# Patient Record
Sex: MALE | Race: WHITE | NOT HISPANIC OR LATINO | Employment: FULL TIME | URBAN - METROPOLITAN AREA
[De-identification: names, ages, dates, MRNs, and addresses within clinical notes are randomized per-mention and may not be internally consistent; named-entity substitution may affect disease eponyms.]

---

## 2018-09-14 ENCOUNTER — SURGERY - HEALTHEAST (OUTPATIENT)
Dept: SURGERY | Facility: HOSPITAL | Age: 28
End: 2018-09-14

## 2018-09-14 ENCOUNTER — ANESTHESIA - HEALTHEAST (OUTPATIENT)
Dept: SURGERY | Facility: HOSPITAL | Age: 28
End: 2018-09-14

## 2018-09-14 ASSESSMENT — MIFFLIN-ST. JEOR: SCORE: 1513.31

## 2018-09-21 ENCOUNTER — OFFICE VISIT - HEALTHEAST (OUTPATIENT)
Dept: SURGERY | Facility: CLINIC | Age: 28
End: 2018-09-21

## 2018-09-21 DIAGNOSIS — Z48.89 POSTOPERATIVE VISIT: ICD-10-CM

## 2018-09-21 ASSESSMENT — MIFFLIN-ST. JEOR: SCORE: 1513.31

## 2018-09-28 ENCOUNTER — OFFICE VISIT - HEALTHEAST (OUTPATIENT)
Dept: SURGERY | Facility: CLINIC | Age: 28
End: 2018-09-28

## 2018-09-28 DIAGNOSIS — Z48.89 POSTOPERATIVE VISIT: ICD-10-CM

## 2018-09-28 ASSESSMENT — MIFFLIN-ST. JEOR: SCORE: 1513.31

## 2021-06-02 VITALS — WEIGHT: 130 LBS | BODY MASS INDEX: 20.4 KG/M2 | HEIGHT: 67 IN

## 2021-06-02 VITALS — BODY MASS INDEX: 20.4 KG/M2 | HEIGHT: 67 IN | WEIGHT: 130 LBS

## 2021-06-16 PROBLEM — L02.211 ABDOMINAL WALL ABSCESS: Status: ACTIVE | Noted: 2018-09-14

## 2021-06-16 PROBLEM — L02.91 ABSCESS: Status: ACTIVE | Noted: 2018-09-14

## 2021-06-20 NOTE — PROGRESS NOTES
" HPI: Yaakov Cerda is here for follow up of a left abdominal wound with a Penrose.  He continues to have purulent drainage daily but states that it is improving.  He denies any fevers, chills nausea or vomiting.    Allergies, Medications, Social History, Past Medical History and Past Surgical History were reviewed and are noted in the chart.    /63 (Patient Site: Right Arm, Patient Position: Sitting, Cuff Size: Adult Regular)  Pulse 80  Ht 5' 7\" (1.702 m)  Wt 130 lb (59 kg)  SpO2 100%  BMI 20.36 kg/m2  Body mass index is 20.36 kg/(m^2).      EXAM:   GENERAL: Appears well  Abdomen- left-sided abdomen with Penrose drain in place no recurrent fluctuant masses no stigmata of infection    Drain Left;Anterior Abdomen (Active)   Site Description Unable to view 9/15/2018  4:00 PM   Dressing Status  Intact;Dry;Clean 9/15/2018  4:00 PM       Incision 09/14/18  Abdomen Left (Active)   Site Assessment Covered, not assessed 9/16/2018  4:13 PM   Surrounding Tissue Assessment Intact 9/14/2018  4:45 PM   Drainage Amount Large 9/14/2018  4:45 PM   Drainage Description Purulent (Pus-Like) 9/14/2018  4:45 PM   Dressing Dry gauze 9/15/2018  3:58 PM   Dressing Status  Intact 9/15/2018  3:58 PM       Incision 09/14/18 Abdomen (Active)   Site Assessment Covered, not assessed 9/16/2018  4:13 PM   Surrounding Tissue Assessment Covered, not assessed 9/16/2018  4:13 PM   Closure Covered, not assessed 9/16/2018 12:00 AM   Drainage Amount Moderate 9/15/2018  8:44 PM   Drainage Description Serosanguineous;Purulent (Pus-Like) 9/15/2018  8:44 PM   Dressing Dry gauze 9/16/2018  7:59 AM   Dressing Status  Intact;Dry;Clean 9/16/2018  4:13 PM       Assessment/Plan: Yaakov Cerda continues to do well. He continues to have purulent drainage from his MRSA wound.  At this point I will continue with the Penrose drain in place for now.  I will have him follow-up in 1 week for possible drain removal and ongoing follow-up.    Yaakov VICTOR" Elsie BLOCK FACS  [facility] Department of Surgery

## 2021-06-20 NOTE — ANESTHESIA POSTPROCEDURE EVALUATION
Patient: Yaakov Cerda  INCISION AND DRAINAGE, TORSO  Anesthesia type: MAC    Patient location: PACU  Last vitals:   Vitals:    09/14/18 2040   BP: 107/57   Pulse: 62   Resp: 17   Temp:    SpO2: 98%     Post vital signs: stable  Level of consciousness: awake and responds to simple questions  Post-anesthesia pain: pain controlled  Post-anesthesia nausea and vomiting: no  Pulmonary: unassisted, return to baseline  Cardiovascular: stable and blood pressure at baseline  Hydration: adequate  Anesthetic events: no    QCDR Measures:  ASA# 11 - Nicolle-op Cardiac Arrest: ASA11B - Patient did NOT experience unanticipated cardiac arrest  ASA# 12 - Nicolle-op Mortality Rate: ASA12B - Patient did NOT die  ASA# 13 - PACU Re-Intubation Rate: NA - No ETT / LMA used for case  ASA# 10 - Composite Anes Safety: ASA10A - No serious adverse event    Additional Notes:

## 2021-06-20 NOTE — ANESTHESIA PREPROCEDURE EVALUATION
Anesthesia Evaluation      Patient summary reviewed   No history of anesthetic complications     Airway   Mallampati: II  Neck ROM: full   Pulmonary - normal exam    breath sounds clear to auscultation  (+) a smoker                         Cardiovascular - negative ROS  Rhythm: regular  Rate: normal,         Neuro/Psych - negative ROS     Endo/Other - negative ROS      GI/Hepatic/Renal      Comments: Crohn's disease  NPO since 9 am  Denies N/V          Dental    (+) poor dentition                       Anesthesia Plan  Planned anesthetic: MAC  Versed, fentanyl, propofol  Discussed possibility of GA/LMA/ETT  ASA 2     Anesthetic plan and risks discussed with: patient  Anesthesia plan special considerations: antiemetics,   Post-op plan: routine recovery

## 2021-06-20 NOTE — PROGRESS NOTES
" HPI: Yaakov Cerda is here for follow up of abdominal wound.  His drain is not putting out any further purulent material.  He is doing well with no complaints.    Allergies, Medications, Social History, Past Medical History and Past Surgical History were reviewed and are noted in the chart.    /65 (Patient Site: Right Arm, Patient Position: Sitting, Cuff Size: Adult Regular)  Pulse 80  Ht 5' 7\" (1.702 m)  Wt 130 lb (59 kg)  SpO2 99%  BMI 20.36 kg/m2  Body mass index is 20.36 kg/(m^2).      EXAM:   GENERAL: Appears well  Abdomen-Penrose drain in place with no stigmata of infection no further drainage    Drain Left;Anterior Abdomen (Active)   Site Description Unable to view 9/15/2018  4:00 PM   Dressing Status  Intact;Dry;Clean 9/15/2018  4:00 PM       Incision 09/14/18  Abdomen Left (Active)   Site Assessment Covered, not assessed 9/16/2018  4:13 PM   Surrounding Tissue Assessment Intact 9/14/2018  4:45 PM   Drainage Amount Large 9/14/2018  4:45 PM   Drainage Description Purulent (Pus-Like) 9/14/2018  4:45 PM   Dressing Dry gauze 9/15/2018  3:58 PM   Dressing Status  Intact 9/15/2018  3:58 PM       Incision 09/14/18 Abdomen (Active)   Site Assessment Covered, not assessed 9/16/2018  4:13 PM   Surrounding Tissue Assessment Covered, not assessed 9/16/2018  4:13 PM   Closure Covered, not assessed 9/16/2018 12:00 AM   Drainage Amount Moderate 9/15/2018  8:44 PM   Drainage Description Serosanguineous;Purulent (Pus-Like) 9/15/2018  8:44 PM   Dressing Dry gauze 9/16/2018  7:59 AM   Dressing Status  Intact;Dry;Clean 9/16/2018  4:13 PM       Assessment/Plan: Yaakov Cerda continues to do well. He wound is healing and overall progressing well.  I have removed the drain in clinic and he is doing well.  I suspect this will heal up with no further complications.  He may now follow up as needed.    Yaakov Zimmerman  DO [facility] Department of Surgery  "

## 2021-06-20 NOTE — ANESTHESIA CARE TRANSFER NOTE
Last vitals:   Vitals:    09/14/18 2040   BP: 107/57   Pulse: 62   Resp: 17   Temp:    SpO2: 98%     Patient's level of consciousness is drowsy  Spontaneous respirations: yes  Maintains airway independently: yes  Dentition unchanged: yes  Oropharynx: oropharynx clear of all foreign objects    QCDR Measures:  ASA# 20 - Surgical Safety Checklist: WHO surgical safety checklist completed prior to induction  PQRS# 430 - Adult PONV Prevention: 4558F - Pt received => 2 anti-emetic agents (different classes) preop & intraop  ASA# 8 - Peds PONV Prevention: NA - Not pediatric patient, not GA or 2 or more risk factors NOT present  PQRS# 424 - Nicolle-op Temp Management: NA - MAC anesthesia or case < 60 minutes  PQRS# 426 - PACU Transfer Protocol: - Transfer of care checklist used  ASA# 14 - Acute Post-op Pain: ASA14B - Patient did NOT experience pain >= 7 out of 10

## 2024-11-10 ENCOUNTER — HOSPITAL ENCOUNTER (EMERGENCY)
Facility: CLINIC | Age: 34
Discharge: HOME | End: 2024-11-10
Attending: EMERGENCY MEDICINE | Admitting: EMERGENCY MEDICINE
Payer: COMMERCIAL

## 2024-11-10 VITALS
WEIGHT: 137 LBS | OXYGEN SATURATION: 100 % | HEART RATE: 73 BPM | RESPIRATION RATE: 16 BRPM | BODY MASS INDEX: 21.46 KG/M2 | SYSTOLIC BLOOD PRESSURE: 143 MMHG | TEMPERATURE: 97 F | DIASTOLIC BLOOD PRESSURE: 91 MMHG

## 2024-11-10 DIAGNOSIS — F41.9 ANXIETY: ICD-10-CM

## 2024-11-10 PROCEDURE — 99283 EMERGENCY DEPT VISIT LOW MDM: CPT | Performed by: EMERGENCY MEDICINE

## 2024-11-10 RX ORDER — CYCLOBENZAPRINE HCL 10 MG
10 TABLET ORAL
COMMUNITY
Start: 2024-09-15

## 2024-11-10 RX ORDER — KETOROLAC TROMETHAMINE 10 MG/1
10 TABLET, FILM COATED ORAL
COMMUNITY
Start: 2024-11-04 | End: 2024-11-11

## 2024-11-10 RX ORDER — OLANZAPINE 2.5 MG/1
2.5 TABLET, FILM COATED ORAL
COMMUNITY
Start: 2024-11-08

## 2024-11-10 RX ORDER — INFLIXIMAB 100 MG/10ML
INJECTION, POWDER, LYOPHILIZED, FOR SOLUTION INTRAVENOUS
COMMUNITY

## 2024-11-10 ASSESSMENT — COLUMBIA-SUICIDE SEVERITY RATING SCALE - C-SSRS
2. HAVE YOU ACTUALLY HAD ANY THOUGHTS OF KILLING YOURSELF IN THE PAST MONTH?: NO
1. IN THE PAST MONTH, HAVE YOU WISHED YOU WERE DEAD OR WISHED YOU COULD GO TO SLEEP AND NOT WAKE UP?: YES
6. HAVE YOU EVER DONE ANYTHING, STARTED TO DO ANYTHING, OR PREPARED TO DO ANYTHING TO END YOUR LIFE?: YES

## 2024-11-10 ASSESSMENT — ACTIVITIES OF DAILY LIVING (ADL)
ADLS_ACUITY_SCORE: 0

## 2024-11-10 NOTE — DISCHARGE INSTRUCTIONS
Return if symptoms worsen or new symptoms develop.  Follow-up with primary care physician next available.  Drink plenty fluids.  Therapist tomorrow.  Follow-up on your care plan as directed.  Work towards getting rides to work.  You contracted for safety with tach and if any thoughts of harming self please return for recheck.

## 2024-11-10 NOTE — ED TRIAGE NOTES
Here for ongoing anxiety issues, this is his 3rd different hospital visit this week, was started on zyprexa on the 8th, has taken one dose so far, has minor other muscle aches and pains but was given medications to take for those on the 8th     Triage Assessment (Adult)       Row Name 11/10/24 0939          Triage Assessment    Airway WDL WDL        Respiratory WDL    Respiratory WDL WDL        Peripheral/Neurovascular WDL    Peripheral Neurovascular WDL WDL        Cognitive/Neuro/Behavioral WDL    Cognitive/Neuro/Behavioral WDL WDL

## 2024-11-10 NOTE — Clinical Note
Yaakov Cerda was seen and treated in our emergency department on 11/10/2024.  He may return to work on 11/13/2024.       If you have any questions or concerns, please don't hesitate to call.      Evaristo Castillo MD

## 2024-11-10 NOTE — ED PROVIDER NOTES
History     Chief Complaint   Patient presents with    Anxiety     HPI  Yaakov Cerda is a 34 year old male with past medical history significant for  Crohn's disease small bowel obstruction EtOH abuse PTSD and anxiety who presents emergency department complaining of increasing anxiety and thoughts of harm.  Patient states he has been having increasing issues lately and his mind in his body are not allowing him to move forward.  He feels very anxious about what is going on in the future he has been seen for a psychiatric evaluation on 11/5/2024 in [place] they did not feel he met criteria also had numerous x-rays and labs done at that time he was seen by behavioral health on the eighth and started on Zyprexa.  He is taken 1 dose of diet his Zyprexa states he is feeling worsening anxiety.  Allergies:  No Known Allergies    Problem List:    Patient Active Problem List    Diagnosis Date Noted    Abdominal wall abscess 09/14/2018     Priority: Medium    Abscess 09/14/2018     Priority: Medium     Added automatically from request for surgery [id number]            Past Medical History:    No past medical history on file.    Past Surgical History:    Past Surgical History:   Procedure Laterality Date    COLON SURGERY      INCISION AND DRAINAGE OF WOUND Left 9/14/2018    Procedure: INCISION AND DRAINAGE, TORSO;  Surgeon: Yaakov Zimmerman DO;  Location: [facility];  Service:        Family History:    No family history on file.    Social History:  Marital Status:  Single [1]  Social History     Tobacco Use    Smoking status: Every Day     Current packs/day: 0.70     Average packs/day: 0.7 packs/day for 5.0 years (3.5 ttl pk-yrs)     Types: Cigarettes    Smokeless tobacco: Never   Substance Use Topics    Alcohol use: No    Drug use: No        Medications:    cyclobenzaprine (FLEXERIL) 10 MG tablet  diclofenac (VOLTAREN) 50 MG EC tablet  ketorolac (TORADOL) 10 MG tablet  OLANZapine (ZYPREXA) 2.5 MG  tablet  ibuprofen (ADVIL,MOTRIN) 200 MG tablet  inFLIXimab, REMICADE, 100 MG injection          Review of Systems  As per HPI  Physical Exam   BP: (!) 143/91  Pulse: 73  Temp: 97  F (36.1  C)  Resp: 16  Weight: 62.1 kg (137 lb)  SpO2: 100 %      Physical Exam  Vitals and nursing note reviewed.   Constitutional:       General: He is not in acute distress.     Appearance: Normal appearance. He is not ill-appearing, toxic-appearing or diaphoretic.   HENT:      Head: Normocephalic and atraumatic.   Eyes:      Conjunctiva/sclera: Conjunctivae normal.   Cardiovascular:      Rate and Rhythm: Normal rate and regular rhythm.      Pulses: Normal pulses.      Heart sounds: Normal heart sounds. No murmur heard.  Pulmonary:      Effort: Pulmonary effort is normal.      Breath sounds: Normal breath sounds. No stridor. No wheezing or rhonchi.   Musculoskeletal:         General: Normal range of motion.      Cervical back: Normal range of motion.   Skin:     General: Skin is warm and dry.      Findings: No rash.   Neurological:      General: No focal deficit present.      Mental Status: He is alert and oriented to person, place, and time.      Motor: No weakness.      Coordination: Coordination normal.   Psychiatric:      Comments: Patient mildly anxious with no current suicidal or homicidal ideation.         ED Course        Procedures              Critical Care time:  none              No results found for this or any previous visit (from the past 24 hours).    Medications - No data to display    Assessments & Plan (with Medical Decision Making) records were reviewed including past medical history medications and allergies.  Office visit with behavioral health on 11/8/2024 was reviewed where patient was started on Zyprexa.  ED visit at [facility] on 10/28/2024 was reviewed.  Patient has had significant workup during his several visits in due to have a psychiatric evaluation during his [facility] visit.  Patient is taken 1  dose of the Zyprexa.  He states he is very anxious because he cannot get to work and cannot follow-up on further mental health issues if he has to be at work.  I did obtain a DEC consultation.  There are no safety concerns that consultation patient was encouraged to speak with therapist to inquire about increasing session frequency.  Patient recommended to continue medications.  I did discuss this with parents who show significant concern for the patient and are very supportive.  I am going to give the patient a few days off of work where he can contact his support system therapist and psychiatrist and work with them on further plan of treating his anxiety.  They are in agreement with this plan at this time.     I have reviewed the nursing notes.    I have reviewed the findings, diagnosis, plan and need for follow up with the patient.         New Prescriptions    No medications on file       Final diagnoses:   Anxiety       11/10/2024   [facility] EMERGENCY DEPT       Evaristo Castillo MD  11/11/24 4308

## 2024-11-10 NOTE — CONSULTS
Diagnostic Evaluation Consultation  Crisis Assessment    Patient Name: Yaakov Cerda  Age:  34 year old  Legal Sex: male  Gender Identity: male  Pronouns:   Race: White  Ethnicity: Not  or   Language: English      Patient was assessed: Virtual: Aqu[employer]-tools   Crisis Assessment Start Date: 11/10/24  Crisis Assessment Start Time: 1203  Crisis Assessment Stop Time: 1234  Patient location: [facility] EMERGENCY DEPT                             ED11    Referral Data and Chief Complaint  Yaakov Cerda presents to the ED via EMS. Patient is presenting to the ED for the following concerns: Anxiety.   Factors that make the mental health crisis life threatening or complex are:  Presents w/ anxiety. Pt identifies multiple stressors to include his car broke down a week ago, his Crohns disease is flaring up. Pt experiencing a sense of doom as he anticipates the upcoming 6 week closure of his employer ([employer]) in mid December and the subsequent financial stress.      Informed Consent and Assessment Methods  Explained the crisis assessment process, including applicable information disclosures and limits to confidentiality, assessed understanding of the process, and obtained consent to proceed with the assessment.  Assessment methods included conducting a formal interview with patient, review of medical records, collaboration with medical staff, and obtaining relevant collateral information from family and community providers when available.  : done     Patient response to interventions: verbalizes understanding  Coping skills were attempted to reduce the crisis:  ER, crisis line     History of the Crisis   This is pt 4th ED visit in the past 1 month. He was seen by  NP on 11/8/24 for med mgmt intake (ref by PCP) and started on Olanzapine - will also continue w/Sertraline. Pt engaged with weekly therapist as well.    Brief Psychosocial History  Family:  Single, Children no  Support System:   Parent(s)  Employment Status:  employed full-time  Source of Income:  salary/wages  Financial Environmental Concerns:  other (see comments) (employer [employer] will close for 6 weeks come mid December ... he will be out of work / not paid during this time.)  Current Hobbies:  exercise/fitness, music, outdoor activities, television/movies/videos  Barriers in Personal Life:  medical conditions/precautions, mental health concerns, transportation concerns    Significant Clinical History  Current Anxiety Symptoms:  anxious  Current Depression/Trauma:  sense of doom  Current Somatic Symptoms:  anxious, excessive worry  Current Psychosis/Thought Disturbance:     Current Eating Symptoms:     Chemical Use History:  Alcohol: None  Benzodiazepines: None  Opiates: None  Cocaine: None  Marijuana: Occasional  Other Use: None   Past diagnosis:  Anxiety Disorder, PTSD, Depression  Family history:  No known history of mental health or chemical health concerns  Past treatment:     Details of most recent treatment:  Pt had been followed for mgmt of Sertaline by PCP -- he was referred to psychiatry, had intake with NP on 11/8/24 who will now follow pt for mgmt of Sertraline as well as new rx Olanzapine. Pt has recently est with therapist for weekly sessions.  Other relevant history:          Collateral Information  Is there collateral information: No     Collateral information name, relationship, phone number:       What happened today:       What is different about patient's functioning:       Concern about alcohol/drug use:      What do you think the patient needs:      Has patient made comments about wanting to kill themselves/others:      If d/c is recommended, can they take part in safety/aftercare planning:       Additional collateral information:        Risk Assessment  [place] Suicide Severity Rating Scale Full Clinical Version:  Suicidal Ideation  Q6 Suicide Behavior (Lifetime): yes          [place] Suicide Severity Rating  Scale Recent:   Suicidal Ideation (Recent)  Q1 Wished to be Dead (Past Month): no  Q2 Suicidal Thoughts (Past Month): no  If yes to Q6, within past 3 months?: no  Level of Risk per Screen: no risks indicated          Environmental or Psychosocial Events: worsening chronic illness (without working vehicle at the moment.)  Protective Factors: Protective Factors: strong bond to family unit, community support, or employment, lives in a responsibly safe and stable environment, able to access care without barriers, sense of importance of health and wellness, supportive ongoing medical and mental health care relationships, help seeking, constructive use of leisure time, enjoyable activities, resilience, optimistic outlook - identification of future goals    Does the patient have thoughts of harming others? Feels Like Hurting Others: no  Previous Attempt to Hurt Others: no  Is the patient engaging in sexually inappropriate behavior?: no    Is the patient engaging in sexually inappropriate behavior?  no        Mental Status Exam   Affect: Appropriate  Appearance: Appropriate  Attention Span/Concentration: Attentive  Eye Contact: Engaged    Fund of Knowledge: Appropriate   Language /Speech Content: Fluent  Language /Speech Volume: Normal  Language /Speech Rate/Productions: Normal  Recent Memory: Intact  Remote Memory: Intact  Mood: Anxious  Orientation to Person: Yes   Orientation to Place: Yes  Orientation to Time of Day: Yes  Orientation to Date: Yes     Situation (Do they understand why they are here?): Yes  Psychomotor Behavior: Normal  Thought Content: Clear  Thought Form: Intact     Mini-Cog Assessment  Number of Words Recalled:    Clock-Drawing Test:     Three Item Recall:    Mini-Cog Total Score:       Medication  Psychotropic medications:   Medication Orders - Psychiatric (From admission, onward)      None             Current Care Team  Patient Care Team:  No Ref-Primary, Physician as PCP -  General    Diagnosis  Patient Active Problem List   Diagnosis Code    Abdominal wall abscess L02.211    Abscess L02.91     41.9 Unspecified Anxiety    Clinical Summary and Substantiation of Recommendations   No acute safety concerns. Pt endorsing help seeking bx, utilizing available resources to aide in mgmt of his anxiety sx. Pt encouraged to take his psychiatric medications as prescribed and attend follow up appt as previously scheduled. He is aware that his new medication can take a bit of time to offer effect.  Pt is encouraged to speak with his therapist to inquire about increasing session frequency due to increased stress/anxious sx as of late to boost his ability to cope. Pt encouraged to lean on his parents (primary support) during this time for enhanced support. Discussion of skills to aide in mgmt of anxiety - safety plan completed. Pt to d/c to home.      Patient coping skills attempted to reduce the crisis:  ER, crisis line    Disposition  Recommended disposition: Individual Therapy, Medication Management        Reviewed case and recommendations with attending provider. Attending Name: Anna DILLARD       Attending concurs with disposition: yes       Patient and/or validated legal guardian concurs with disposition:   yes       Final disposition:  discharge    Legal status on admission:      Assessment Details   Total duration spent with the patient: 31 min     CPT code(s) utilized: 82592 - Psychotherapy for Crisis - 60 (30-74*) min    Itzel Poe [facility] HospitalGEORGE, Psychotherapist  [facility] - Triage & Transition Services  Callback: [phone]

## 2024-11-11 ENCOUNTER — TELEPHONE (OUTPATIENT)
Dept: BEHAVIORAL HEALTH | Facility: CLINIC | Age: 34
End: 2024-11-11
Payer: COMMERCIAL